# Patient Record
Sex: MALE | Race: WHITE | URBAN - METROPOLITAN AREA
[De-identification: names, ages, dates, MRNs, and addresses within clinical notes are randomized per-mention and may not be internally consistent; named-entity substitution may affect disease eponyms.]

---

## 2021-12-23 ENCOUNTER — HOSPITAL ENCOUNTER (EMERGENCY)
Age: 36
Discharge: HOME | End: 2021-12-23
Attending: EMERGENCY MEDICINE

## 2021-12-23 ENCOUNTER — APPOINTMENT (OUTPATIENT)
Dept: CT IMAGING | Age: 36
End: 2021-12-23

## 2021-12-23 ENCOUNTER — APPOINTMENT (OUTPATIENT)
Dept: GENERAL RADIOLOGY | Age: 36
End: 2021-12-23

## 2021-12-23 VITALS
TEMPERATURE: 99.9 F | SYSTOLIC BLOOD PRESSURE: 122 MMHG | RESPIRATION RATE: 16 BRPM | HEIGHT: 71 IN | DIASTOLIC BLOOD PRESSURE: 89 MMHG | WEIGHT: 190 LBS | HEART RATE: 95 BPM | OXYGEN SATURATION: 99 % | BODY MASS INDEX: 26.6 KG/M2

## 2021-12-23 DIAGNOSIS — U07.1 COVID: Primary | ICD-10-CM

## 2021-12-23 LAB
A/G RATIO: 1 (ref 1.1–2.2)
ALBUMIN SERPL-MCNC: 4.1 G/DL (ref 3.4–5)
ALP BLD-CCNC: 142 U/L (ref 40–129)
ALT SERPL-CCNC: 89 U/L (ref 10–40)
ANION GAP SERPL CALCULATED.3IONS-SCNC: 15 MMOL/L (ref 3–16)
AST SERPL-CCNC: 60 U/L (ref 15–37)
BACTERIA: ABNORMAL /HPF
BASOPHILS ABSOLUTE: 0 K/UL (ref 0–0.2)
BASOPHILS RELATIVE PERCENT: 0.2 %
BILIRUB SERPL-MCNC: 1.1 MG/DL (ref 0–1)
BILIRUBIN URINE: ABNORMAL
BLOOD, URINE: NEGATIVE
BUN BLDV-MCNC: 9 MG/DL (ref 7–20)
CALCIUM SERPL-MCNC: 9.2 MG/DL (ref 8.3–10.6)
CHLORIDE BLD-SCNC: 92 MMOL/L (ref 99–110)
CLARITY: CLEAR
CO2: 25 MMOL/L (ref 21–32)
COLOR: ABNORMAL
CREAT SERPL-MCNC: 0.9 MG/DL (ref 0.9–1.3)
D DIMER: 302 NG/ML DDU (ref 0–229)
EOSINOPHILS ABSOLUTE: 0 K/UL (ref 0–0.6)
EOSINOPHILS RELATIVE PERCENT: 0 %
EPITHELIAL CELLS, UA: ABNORMAL /HPF (ref 0–5)
GFR AFRICAN AMERICAN: >60
GFR NON-AFRICAN AMERICAN: >60
GLUCOSE BLD-MCNC: 106 MG/DL (ref 70–99)
GLUCOSE URINE: NEGATIVE MG/DL
HCT VFR BLD CALC: 43.6 % (ref 40.5–52.5)
HEMOGLOBIN: 14.9 G/DL (ref 13.5–17.5)
KETONES, URINE: >=80 MG/DL
LEUKOCYTE ESTERASE, URINE: NEGATIVE
LYMPHOCYTES ABSOLUTE: 0.8 K/UL (ref 1–5.1)
LYMPHOCYTES RELATIVE PERCENT: 10.4 %
MCH RBC QN AUTO: 30.6 PG (ref 26–34)
MCHC RBC AUTO-ENTMCNC: 34.3 G/DL (ref 31–36)
MCV RBC AUTO: 89.4 FL (ref 80–100)
MICROSCOPIC EXAMINATION: YES
MONOCYTES ABSOLUTE: 1.2 K/UL (ref 0–1.3)
MONOCYTES RELATIVE PERCENT: 15.1 %
MUCUS: ABNORMAL /LPF
NEUTROPHILS ABSOLUTE: 5.7 K/UL (ref 1.7–7.7)
NEUTROPHILS RELATIVE PERCENT: 74.3 %
NITRITE, URINE: NEGATIVE
PDW BLD-RTO: 13.1 % (ref 12.4–15.4)
PH UA: 6 (ref 5–8)
PLATELET # BLD: 249 K/UL (ref 135–450)
PMV BLD AUTO: 8.9 FL (ref 5–10.5)
POTASSIUM REFLEX MAGNESIUM: 4.2 MMOL/L (ref 3.5–5.1)
PROTEIN UA: 100 MG/DL
RBC # BLD: 4.88 M/UL (ref 4.2–5.9)
RBC UA: ABNORMAL /HPF (ref 0–4)
SARS-COV-2, NAAT: DETECTED
SODIUM BLD-SCNC: 132 MMOL/L (ref 136–145)
SPECIFIC GRAVITY UA: 1.02 (ref 1–1.03)
TOTAL PROTEIN: 8.1 G/DL (ref 6.4–8.2)
TROPONIN: <0.01 NG/ML
URINE TYPE: ABNORMAL
UROBILINOGEN, URINE: 1 E.U./DL
WBC # BLD: 7.7 K/UL (ref 4–11)
WBC UA: ABNORMAL /HPF (ref 0–5)

## 2021-12-23 PROCEDURE — 71045 X-RAY EXAM CHEST 1 VIEW: CPT

## 2021-12-23 PROCEDURE — 93005 ELECTROCARDIOGRAM TRACING: CPT | Performed by: PHYSICIAN ASSISTANT

## 2021-12-23 PROCEDURE — 85025 COMPLETE CBC W/AUTO DIFF WBC: CPT

## 2021-12-23 PROCEDURE — 85379 FIBRIN DEGRADATION QUANT: CPT

## 2021-12-23 PROCEDURE — 84484 ASSAY OF TROPONIN QUANT: CPT

## 2021-12-23 PROCEDURE — 81001 URINALYSIS AUTO W/SCOPE: CPT

## 2021-12-23 PROCEDURE — 80053 COMPREHEN METABOLIC PANEL: CPT

## 2021-12-23 PROCEDURE — 6370000000 HC RX 637 (ALT 250 FOR IP): Performed by: PHYSICIAN ASSISTANT

## 2021-12-23 PROCEDURE — 87635 SARS-COV-2 COVID-19 AMP PRB: CPT

## 2021-12-23 PROCEDURE — 6360000004 HC RX CONTRAST MEDICATION: Performed by: EMERGENCY MEDICINE

## 2021-12-23 PROCEDURE — 71260 CT THORAX DX C+: CPT

## 2021-12-23 PROCEDURE — 99285 EMERGENCY DEPT VISIT HI MDM: CPT

## 2021-12-23 RX ORDER — ONDANSETRON 4 MG/1
4 TABLET, ORALLY DISINTEGRATING ORAL EVERY 8 HOURS PRN
Qty: 15 TABLET | Refills: 0 | Status: SHIPPED | OUTPATIENT
Start: 2021-12-23

## 2021-12-23 RX ORDER — ACETAMINOPHEN 325 MG/1
650 TABLET ORAL ONCE
Status: COMPLETED | OUTPATIENT
Start: 2021-12-23 | End: 2021-12-23

## 2021-12-23 RX ORDER — ONDANSETRON 4 MG/1
4 TABLET, ORALLY DISINTEGRATING ORAL ONCE
Status: COMPLETED | OUTPATIENT
Start: 2021-12-23 | End: 2021-12-23

## 2021-12-23 RX ADMIN — IOPAMIDOL 75 ML: 755 INJECTION, SOLUTION INTRAVENOUS at 17:03

## 2021-12-23 RX ADMIN — ONDANSETRON 4 MG: 4 TABLET, ORALLY DISINTEGRATING ORAL at 16:46

## 2021-12-23 RX ADMIN — ACETAMINOPHEN 650 MG: 325 TABLET ORAL at 16:46

## 2021-12-23 ASSESSMENT — PAIN SCALES - GENERAL
PAINLEVEL_OUTOF10: 1
PAINLEVEL_OUTOF10: 2
PAINLEVEL_OUTOF10: 2

## 2021-12-23 ASSESSMENT — ENCOUNTER SYMPTOMS
ABDOMINAL PAIN: 1
NAUSEA: 1
VOMITING: 1
BACK PAIN: 0
SINUS PRESSURE: 0
SHORTNESS OF BREATH: 1
COUGH: 1
SORE THROAT: 0
CHEST TIGHTNESS: 1
DIARRHEA: 0

## 2021-12-23 ASSESSMENT — PAIN DESCRIPTION - LOCATION
LOCATION: GENERALIZED
LOCATION: GENERALIZED

## 2021-12-23 ASSESSMENT — PAIN DESCRIPTION - DESCRIPTORS: DESCRIPTORS: ACHING

## 2021-12-23 ASSESSMENT — PAIN DESCRIPTION - PAIN TYPE
TYPE: ACUTE PAIN
TYPE: ACUTE PAIN

## 2021-12-23 NOTE — ED NOTES
Discharge instructions reviewed without questions. No further needs voiced at this time. Ambulatory from department in stable condition.        Mono Duncan RN  12/23/21 3047

## 2021-12-23 NOTE — ED PROVIDER NOTES
reviewed. No pertinent past medical history. Procedure Laterality Date    HAND TENDON SURGERY  3/22/12    Left ring EDC tendon repair    HERNIA REPAIR         Medications:  Discharge Medication List as of 12/23/2021  6:07 PM            Review of Systems:  (2-9 systems needed)  Review of Systems   Constitutional: Positive for appetite change, chills and fever. HENT: Positive for postnasal drip. Negative for congestion, sinus pressure, sneezing and sore throat. Eyes: Negative for visual disturbance. Respiratory: Positive for cough, chest tightness and shortness of breath. Cardiovascular: Negative for chest pain and palpitations. Gastrointestinal: Positive for abdominal pain, nausea and vomiting. Negative for diarrhea. Genitourinary: Positive for decreased urine volume. Negative for dysuria, frequency, hematuria and urgency. Musculoskeletal: Negative for back pain. Skin: Negative for rash. Neurological: Positive for light-headedness and headaches. Negative for dizziness and weakness. Physical Exam:  Physical Exam  Vitals and nursing note reviewed. Constitutional:       Appearance: Normal appearance. He is not diaphoretic. HENT:      Head: Normocephalic and atraumatic. Nose: Nose normal.      Mouth/Throat:      Mouth: Mucous membranes are moist.   Eyes:      General:         Right eye: No discharge. Left eye: No discharge. Extraocular Movements: Extraocular movements intact. Pupils: Pupils are equal, round, and reactive to light. Cardiovascular:      Rate and Rhythm: Regular rhythm. Tachycardia present. Pulses: Normal pulses. Heart sounds: Normal heart sounds. No murmur heard. No friction rub. No gallop. Pulmonary:      Effort: Pulmonary effort is normal. No respiratory distress. Breath sounds: Normal breath sounds. No stridor. No wheezing, rhonchi or rales. Abdominal:      General: Abdomen is flat. Palpations: Abdomen is soft. Tenderness: There is generalized abdominal tenderness. Musculoskeletal:         General: Normal range of motion. Cervical back: Normal range of motion and neck supple. Skin:     General: Skin is warm and dry. Coloration: Skin is not pale. Neurological:      Mental Status: He is alert and oriented to person, place, and time.    Psychiatric:         Mood and Affect: Mood normal.         Behavior: Behavior normal.         MEDICAL DECISION MAKING    Vitals:    Vitals:    12/23/21 1507 12/23/21 1650 12/23/21 1726   BP: 133/89 122/89    Pulse: 116  95   Resp: 18 16    Temp: 99.9 °F (37.7 °C)     TempSrc: Oral     SpO2: 94% 99%    Weight: 190 lb (86.2 kg)     Height: 5' 11\" (1.803 m)         LABS:  Labs Reviewed   COVID-19, RAPID - Abnormal; Notable for the following components:       Result Value    SARS-CoV-2, NAAT DETECTED (*)     All other components within normal limits    Narrative:     Elizabeth Doctor tel. [phone],  Microbiology results called to and read back by Eric Gar RN, 12/23/2021 16:17,  by YRIS  Performed at:  [place] [address] [place]   Phone [phone]   CBC WITH AUTO DIFFERENTIAL - Abnormal; Notable for the following components:    Lymphocytes Absolute 0.8 (*)     All other components within normal limits    Narrative:     Performed at:  [place] [address] [place]   Phone [phone]   COMPREHENSIVE METABOLIC PANEL W/ REFLEX TO MG FOR LOW K - Abnormal; Notable for the following components:    Sodium 132 (*)     Chloride 92 (*)     Glucose 106 (*)     Albumin/Globulin Ratio 1.0 (*)     Total Bilirubin 1.1 (*)     Alkaline Phosphatase 142 (*)     ALT 89 (*)     AST 60 (*)     All other components within normal limits    Narrative:     Performed at:  [place] [address] [place]   Phone [phone]   URINALYSIS - Abnormal; Notable for the following components:    Color, UA DARK YELLOW (*)     Bilirubin Urine MODERATE (*)     Ketones, Urine >=80 (*)     Protein,  (*)     All other components within normal limits    Narrative:     Performed at:  [address], [facility]   Phone [phone]   D-DIMER, QUANTITATIVE - Abnormal; Notable for the following components:    D-Dimer, Quant 302 (*)     All other components within normal limits    Narrative:     Performed at:  [address], [facility]   Phone [phone]   MICROSCOPIC URINALYSIS - Abnormal; Notable for the following components:    Mucus, UA 4+ (*)     Epithelial Cells, UA 6-10 (*)     Bacteria, UA 3+ (*)     All other components within normal limits    Narrative:     Performed at:  [address], [facility]   Phone [phone]   TROPONIN    Narrative:     Performed at:  [address], [facility]   Phone  of labs reviewed and were negative at this time or not returned at the time of this note. RADIOLOGY:   Non-plain film images such as CT, Ultrasound and MRI are read by the radiologist. LUCIAN Cancino have directly visualized the radiologic plain film image(s) with the below findings:      Interpretation per the Radiologist below, if available at the time of this note:    CT CHEST PULMONARY EMBOLISM W CONTRAST   Final Result   No evidence of pulmonary embolism. Multifocal airspace disease bilaterally compatible with multifocal pneumonia   and specifically, COVID pneumonia. XR CHEST PORTABLE   Final Result   Multifocal airspace disease. Pattern may reflect COVID pneumonia. Correlate   with COVID testing.                   MEDICAL DECISION MAKING / ED COURSE:      Patient was seen and evaluated emergency department by myself and attending physician. All diagnostic, treatment, disposition decisions were made in conjunction with attending physician. Patient was evaluated in the emergency department for concerns of Covid, he was complaining of chest pain and shortness of breath. He was tachycardic at triage and had attempt of 99.9, with 99% oxygen on room air. She had the patient having diffuse chest pain and tachycardia we will obtain CBC, CMP, troponin, D-dimer, chest x-ray. The patient will be given Zofran for nausea and Tylenol for his fever. CBC without evidence of leukocytosis or acute anemia  CMP with sodium 132, maintain renal function, the patient does have elevated liver enzymes, no previous lab work for comparison. Troponin is negative  D-dimer is elevated at 302  Rapid Covid is positive  Urinalysis without evidence of infection although the patient does appear dehydrated with protein, ketones, and bilirubin present. EKG interpreted by attending physician found abnormal sinus rhythm. Chest x-ray representing Covid pneumonia. Due to clinical suspicion of PE the d dimer was obtained, although dimer may be falsely elevated due to covid diagnosis, I am still concerned for PE therefore a CT PE study will be obtained. Imaging results as read by radiology showed no evidence of pulmonary embolism, he does have multifocal airspace disease compatible with Covid pneumonia. Patient was given:  Medications   ondansetron (ZOFRAN-ODT) disintegrating tablet 4 mg (4 mg Oral Given 12/23/21 1646)   acetaminophen (TYLENOL) tablet 650 mg (650 mg Oral Given 12/23/21 1646)   iopamidol (ISOVUE-370) 76 % injection 75 mL (75 mLs IntraVENous Given 12/23/21 1703)     Upon reevaluation patient states his nausea is improved and he has been able to tolerate p.o. liquids.   His heart rate has improved to 95 bpm he will be discharged home with strict return precautions to return if he develops any new or worsening symptoms such as worsening shortness of breath or chest pain. The patient was ambulated by nursing staff and remained hemodynamically stable during ambulation. I did agree to write for short prescription of Zofran for nausea and vomiting. He does not have a primary care physician will be provided a referral upon discharge for close follow-up. Patient is hemodynamically stable at discharge and discharged home in good condition. The patient tolerated their visit well. I evaluated the patient. The physician was available for consultation as needed. The patient and / or the family were informed of the results of any tests, a time was given to answer questions, a plan was proposed and they agreed with plan. CLINICAL IMPRESSION:  1. COVID      .   Results for orders placed or performed during the hospital encounter of 12/23/21   COVID-19, Rapid    Specimen: Nasopharyngeal Swab   Result Value Ref Range    SARS-CoV-2, NAAT DETECTED (AA) Not Detected   CBC Auto Differential   Result Value Ref Range    WBC 7.7 4.0 - 11.0 K/uL    RBC 4.88 4.20 - 5.90 M/uL    Hemoglobin 14.9 13.5 - 17.5 g/dL    Hematocrit 43.6 40.5 - 52.5 %    MCV 89.4 80.0 - 100.0 fL    MCH 30.6 26.0 - 34.0 pg    MCHC 34.3 31.0 - 36.0 g/dL    RDW 13.1 12.4 - 15.4 %    Platelets 702 639 - 400 K/uL    MPV 8.9 5.0 - 10.5 fL    Neutrophils % 74.3 %    Lymphocytes % 10.4 %    Monocytes % 15.1 %    Eosinophils % 0.0 %    Basophils % 0.2 %    Neutrophils Absolute 5.7 1.7 - 7.7 K/uL    Lymphocytes Absolute 0.8 (L) 1.0 - 5.1 K/uL    Monocytes Absolute 1.2 0.0 - 1.3 K/uL    Eosinophils Absolute 0.0 0.0 - 0.6 K/uL    Basophils Absolute 0.0 0.0 - 0.2 K/uL   Comprehensive Metabolic Panel w/ Reflex to MG   Result Value Ref Range    Sodium 132 (L) 136 - 145 mmol/L    Potassium reflex Magnesium 4.2 3.5 - 5.1 mmol/L    Chloride 92 (L) 99 - 110 mmol/L    CO2 25 21 - 32 mmol/L    Anion Gap 15 3 - 16    Glucose 106 (H) 70 - 99 mg/dL BUN 9 7 - 20 mg/dL    CREATININE 0.9 0.9 - 1.3 mg/dL    GFR Non-African American >60 >60    GFR African American >60 >60    Calcium 9.2 8.3 - 10.6 mg/dL    Total Protein 8.1 6.4 - 8.2 g/dL    Albumin 4.1 3.4 - 5.0 g/dL    Albumin/Globulin Ratio 1.0 (L) 1.1 - 2.2    Total Bilirubin 1.1 (H) 0.0 - 1.0 mg/dL    Alkaline Phosphatase 142 (H) 40 - 129 U/L    ALT 89 (H) 10 - 40 U/L    AST 60 (H) 15 - 37 U/L   Troponin   Result Value Ref Range    Troponin <0.01 <0.01 ng/mL   Urinalysis, reflex to microscopic   Result Value Ref Range    Color, UA DARK YELLOW (A) Straw/Yellow    Clarity, UA Clear Clear    Glucose, Ur Negative Negative mg/dL    Bilirubin Urine MODERATE (A) Negative    Ketones, Urine >=80 (A) Negative mg/dL    Specific Gravity, UA 1.025 1.005 - 1.030    Blood, Urine Negative Negative    pH, UA 6.0 5.0 - 8.0    Protein,  (A) Negative mg/dL    Urobilinogen, Urine 1.0 <2.0 E.U./dL    Nitrite, Urine Negative Negative    Leukocyte Esterase, Urine Negative Negative    Microscopic Examination YES     Urine Type NotGiven    D-Dimer, Quantitative   Result Value Ref Range    D-Dimer, Quant 302 (H) 0 - 229 ng/mL DDU   Microscopic Urinalysis   Result Value Ref Range    Mucus, UA 4+ (A) None Seen /LPF    WBC, UA 0-2 0 - 5 /HPF    RBC, UA 0-2 0 - 4 /HPF    Epithelial Cells, UA 6-10 (A) 0 - 5 /HPF    Bacteria, UA 3+ (A) None Seen /HPF   EKG 12 Lead   Result Value Ref Range    Ventricular Rate 98 BPM    Atrial Rate 98 BPM    P-R Interval 144 ms    QRS Duration 102 ms    Q-T Interval 346 ms    QTc Calculation (Bazett) 441 ms    P Axis 58 degrees    R Axis -64 degrees    T Axis 46 degrees    Diagnosis       Normal sinus rhythmLeft anterior fascicular blockAbnormal ECGNo previous ECGs available       I estimate there is LOW risk for PULMONARY EMBOLISM, ACUTE CORONARY SYNDROME, OR THORACIC AORTIC DISSECTION, thus I consider the discharge disposition reasonable.  Chuck Romero and I have discussed the diagnosis and risks, and we agree with discharging home to follow-up with their primary doctor. We also discussed returning to the Emergency Department immediately if new or worsening symptoms occur. We have discussed the symptoms which are most concerning (e.g., bloody sputum, fever, worsening pain or shortness of breath, vomiting) that necessitate immediate return. FINAL Impression    1. COVID        Blood pressure 122/89, pulse 95, temperature 99.9 °F (37.7 °C), temperature source Oral, resp. rate 16, height 5' 11\" (1.803 m), weight 190 lb (86.2 kg), SpO2 99 %.       DISPOSITION        PATIENT REFERRED TO:  [facility]  [address]  [address]  Schedule an appointment as soon as possible for a visit       [facility]  [address]  Go to   If symptoms worsen      DISCHARGE MEDICATIONS:  Discharge Medication List as of 12/23/2021  6:07 PM      START taking these medications    Details   ondansetron (ZOFRAN ODT) 4 MG disintegrating tablet Take 1 tablet by mouth every 8 hours as needed for Nausea or Vomiting, Disp-15 tablet, R-0Normal             DISCONTINUED MEDICATIONS:  Discharge Medication List as of 12/23/2021  6:07 PM                 (Please note the MDM and HPI sections of this note were completed with a voice recognition program.  Efforts were made to edit the dictations but occasionally words are mis-transcribed.)    Electronically signed, Sharad Pedroza,           Sharad Pedroza  12/23/21 [id number]

## 2021-12-23 NOTE — ED NOTES
Ambulated pt with a pulse oximeter. Pt ambulated approximately 100 ft, maintained steady gait. Oxygen levels hovered around 95%, while Pulse hovered around .      Kris Moreno  12/23/21 9714

## 2021-12-23 NOTE — ED PROVIDER NOTES
I independently performed a history and physical on [employer]. All diagnostic, treatment, and disposition decisions were made by myself in conjunction with the advanced practice provider. For further details of [facility] emergency department encounter, please see the MARIAH/resident's documentation. Briefly, this is a 28-year-old male who presents emerge department for evaluation of cough, shortness of breath in the setting of Covid-like symptoms. He had recent sick contact with a family member with Covid. He states he has had fevers for the past week, body aches, decreased appetite. Says that he has some mild chest tightness. No prior cardiac history. He is unvaccinated. He has elevated D-dimer, CTA was performed which reveals no PE but does show changes consistent with Covid. He has mild liver enzyme elevation that is likely consistent with Covid infection as he has no abdominal pain. Patient has normal oxygen level and he will be counseled on supportive management and plan for PCP follow-up. EKG  The Ekg interpreted by myself in the emergency department in the absence of a cardiologist.  normal sinus rhythm with a rate of 98  Axis is   Normal  QTc is  within an acceptable range  Intervals and Durations are unremarkable. No specific ST-T wave changes appreciated. No evidence of acute ischemia.    No prior EKG available for comparison       Shanice Tomlinson MD  12/23/21 3828

## 2021-12-24 LAB
EKG ATRIAL RATE: 98 BPM
EKG DIAGNOSIS: NORMAL
EKG P AXIS: 58 DEGREES
EKG P-R INTERVAL: 144 MS
EKG Q-T INTERVAL: 346 MS
EKG QRS DURATION: 102 MS
EKG QTC CALCULATION (BAZETT): 441 MS
EKG R AXIS: -64 DEGREES
EKG T AXIS: 46 DEGREES
EKG VENTRICULAR RATE: 98 BPM

## 2021-12-24 PROCEDURE — 93010 ELECTROCARDIOGRAM REPORT: CPT | Performed by: INTERNAL MEDICINE
